# Patient Record
Sex: MALE | Race: WHITE | NOT HISPANIC OR LATINO | Employment: OTHER | URBAN - METROPOLITAN AREA
[De-identification: names, ages, dates, MRNs, and addresses within clinical notes are randomized per-mention and may not be internally consistent; named-entity substitution may affect disease eponyms.]

---

## 2017-02-07 ENCOUNTER — OFFICE VISIT (OUTPATIENT)
Dept: NEUROLOGY | Facility: CLINIC | Age: 82
End: 2017-02-07
Payer: MEDICARE

## 2017-02-07 VITALS
WEIGHT: 144.63 LBS | DIASTOLIC BLOOD PRESSURE: 68 MMHG | SYSTOLIC BLOOD PRESSURE: 134 MMHG | RESPIRATION RATE: 17 BRPM | HEIGHT: 72 IN | HEART RATE: 88 BPM | BODY MASS INDEX: 19.59 KG/M2

## 2017-02-07 DIAGNOSIS — G30.9 ALZHEIMER'S DEMENTIA WITH BEHAVIORAL DISTURBANCE, UNSPECIFIED TIMING OF DEMENTIA ONSET: ICD-10-CM

## 2017-02-07 DIAGNOSIS — F02.81 ALZHEIMER'S DEMENTIA WITH BEHAVIORAL DISTURBANCE, UNSPECIFIED TIMING OF DEMENTIA ONSET: ICD-10-CM

## 2017-02-07 PROBLEM — F02.818 ALZHEIMER'S DEMENTIA WITH BEHAVIORAL DISTURBANCE: Status: ACTIVE | Noted: 2017-02-07

## 2017-02-07 PROCEDURE — 99999 PR PBB SHADOW E&M-EST. PATIENT-LVL III: CPT | Mod: PBBFAC,,, | Performed by: NURSE PRACTITIONER

## 2017-02-07 PROCEDURE — 99213 OFFICE O/P EST LOW 20 MIN: CPT | Mod: S$PBB,,, | Performed by: NURSE PRACTITIONER

## 2017-02-07 PROCEDURE — 99213 OFFICE O/P EST LOW 20 MIN: CPT | Mod: PBBFAC | Performed by: NURSE PRACTITIONER

## 2017-02-07 NOTE — MR AVS SNAPSHOT
[facility]  [address] [facility] [address]  Phone: [phone]  Fax: [phone]                  Maritza Coreas Jr.   2017 2:20 PM   Office Visit    Description:  Male : 1930   Provider:  Heide Martinez NP   Department:  [facility]           Reason for Visit     Memory Loss                To Do List           Future Appointments        Provider Department Dept Phone    2017 2:20 PM Heide Martinez NP [facility] [phone]      Goals (5 Years of Data)     None      Follow-Up and Disposition     Return in about 3 months (around 2017).      [facility]     [facility] Nurse Care Line -  Assistance  Registered nurses in the [facility] Call Center provide clinical advisement, health education, appointment booking, and other advisory services.  Call for this free service at [phone].             Medications           Message regarding Medications     Verify the changes and/or additions to your medication regime listed below are the same as discussed with your clinician today.  If any of these changes or additions are incorrect, please notify your healthcare provider.             Verify that the below list of medications is an accurate representation of the medications you are currently taking.  If none reported, the list may be blank. If incorrect, please contact your healthcare provider. Carry this list with you in case of emergency.           Current Medications     aspirin 81 MG Chew Take 81 mg by mouth every Mon, Wed, Fri.    memantine (NAMENDA) 5 MG Tab Take 1 tablet (5 mg total) by mouth 2 (two) times daily.    MULTIVIT-MIN/FA/LYCOPENE/LUT (CENTRUM SILVER ULTRA MEN'S ORAL) Take 1 tablet by mouth once daily.    quetiapine (SEROQUEL) 25 MG Tab Take 1 tablet by mouth every morning and 2 tablets every evening.           Clinical Reference Information           Your Vitals Were     BP Pulse Resp Height  Weight BMI    134/68 (BP Location: Left arm, Patient Position: Sitting, BP Method: Manual) 88 17 6' (1.829 m) 65.6 kg (144 lb 10 oz) 19.61 kg/m2      Blood Pressure          Most Recent Value    BP  134/68      Allergies as of 2/7/2017     No Known Allergies      Immunizations Administered on Date of Encounter - 2/7/2017     None      [facility] Sign-Up     Activating your [facility] account is as easy as 1-2-3!     1) Visit [email or web address], select Sign Up Now, enter this activation code and your date of birth, then select Next.  Activation code not generated  Current Patient Portal Status: Account disabled      2) Create a username and password to use when you visit [facility] in the future and select a security question in case you lose your password and select Next.    3) Enter your e-mail address and click Sign Up!    Additional Information  If you have questions, please e-mail [email or web address] or call [phone] to talk to our [facility] staff. Remember, [facility] is NOT to be used for urgent needs. For medical emergencies, dial 911.         Language Assistance Services     ATTENTION: Language assistance services are available, free of charge. Please call [phone].      ATENCIÓN: Si habla español, tiene a nunes disposición servicios gratuitos de asistencia lingüística. Llame al [phone].     CHÚ Ý: N?u b?n nói Ti?ng Vi?t, có các d?ch v? h? tr? ngôn ng? mi?n phí dành cho b?n. G?i s? [phone].         [facility] complies with applicable Federal civil rights laws and does not discriminate on the basis of race, color, national origin, age, disability, or sex.

## 2017-02-07 NOTE — PROGRESS NOTES
HPI: Maritza Coreas Jr. is a 86 y.o. male with a history of short term memory loss with some executive dysfunctions and bouts of confusions, most consistent with Alzheimer's disease.     He presents today for a routine follow up visit. He continues with agitation in the afternoon, as well as confusion which becomes worse later in the day, which is helped with Seroquel. He does talk to his TV and cannot understand that persons on the TV are not actually present. He also continues on Namenda. He has had recent labs done by his PCP.     He has gained weight since his last visit, and has a good appetite. He has had no falls per his wife. He is sleeping well at night.     Review of Systems  Review of Systems   Unable to perform ROS: Dementia     Objective:      Physical Exam   Constitutional: He appears well-developed and well-nourished. No distress.   Eyes: EOM are normal. Pupils are equal, round, and reactive to light.   Cardiovascular: Intact distal pulses.    Musculoskeletal: He exhibits no edema.   Neurological: He has normal strength. He has a normal Finger-Nose-Finger Test, a normal Heel to Raines Test and a normal Romberg Test. Gait normal.   Psychiatric: His speech is normal.   Neurologic Exam     Mental Status   Disoriented to person.   Disoriented to place.   Disoriented to time. (He thinks it is November which is incorrect)  Recall of objects at 5 minutes: Recalls 0/3 objects at 3 minutes. He recalls 1/3 with hints.   Attention: normal. Concentration: normal.   Speech: speech is normal   Level of consciousness: alert  Knowledge: inconsistent with education.   Able to name object. Able to repeat. Normal comprehension.        Remote memory better intact but recent recall is confused.   The patient has poor insight.  Patient speaks about enjoying his harmonica playing  There are some paraphasic errors of speech, however     Cranial Nerves     CN II   Visual fields full to confrontation.   Right visual field  deficit: none    CN III, IV, VI   Pupils are equal, round, and reactive to light.  Extraocular motions are normal.   CN III: no CN III palsy  Nystagmus: none   Diplopia: none  Ophthalmoparesis: none    CN V   Facial sensation intact.     CN VII   Facial expression full, symmetric.     CN VIII   CN VIII normal.     CN IX, X   CN IX normal.   CN X normal.     CN XI   CN XI normal.     CN XII   CN XII normal.        Optic disc are flat with normal vasculature      Motor Exam   Muscle bulk: normal  Overall muscle tone: normal    Strength   Strength 5/5 throughout.        Strength exam is limited by apraxi     Sensory Exam   Light touch normal.   Vibration normal.     Gait, Coordination, and Reflexes     Gait  Gait: normal    Coordination   Romberg: negative  Finger to nose coordination: normal  Heel to shin coordination: normal    Tremor   Resting tremor: absent  Intention tremor: absent  Action tremor: absent    Reflexes   Right ankle clonus: absent  Left ankle clonus: absent       1+ reflexes in the upper and lower extremities throughout    Patient is calm and pleasant    CT head: Age-appropriate generalized cerebral volume loss with severe degree of patchy decreased attenuation supratentorial white matter suggestive for chronic microvascular ischemic change.   No evidence for acute intracranial hemorrhage. Clinical correlation and further evaluation as warranted    Assessment/ Recommendations:    Maritza Coreas Jr. is a 86 y.o. male with a history of short term memory loss with some executive dysfunctions and bouts of confusions, most consistent with Alzheimer's disease. His overall mentation is unchanged. He continues with bouts of confusion and sundowning behavior, which is helped with Seroquel.     I recommend:   1. Continue low dose Namenda, given his history of renal insufficiency. He previously experienced adverse effects with Trazodone and Aricept.    2. Continue Seroquel for agitation and sundowning  behavior. The benefits of treating poor behavior/ mood/agitation seem to outweigh the risk of using the lowest possible dose of this medication for the shortest period of time possible. The patient should be brought to the ER or dial 911 at any point for worsening mood or if he/she is a threat to self or others.  3. Obtain recent labs from Dr. Danielson to monitor renal function, lipids, and fasting glucose, given his Seroquel and Namenda use. His anemia is treated per Dr. Mayes.     RTC 3 months

## 2017-02-09 ENCOUNTER — TELEPHONE (OUTPATIENT)
Dept: NEUROLOGY | Facility: CLINIC | Age: 82
End: 2017-02-09

## 2017-02-09 NOTE — TELEPHONE ENCOUNTER
"At his visit this week, patient's wife was inquiring about a "new" Alzheimer's medication, but was unable to offer complete information. It appears that she was asking about Namzaric. Namzaric is actually a combination of Namenda and Aricept. He is already taking Namenda, and had side effects with Aricept. Because he experienced side effects with Aricept, he would not be a candidate for Namzaric.   "

## 2017-04-26 ENCOUNTER — TELEPHONE (OUTPATIENT)
Dept: NEUROLOGY | Facility: CLINIC | Age: 82
End: 2017-04-26

## 2017-04-26 ENCOUNTER — OFFICE VISIT (OUTPATIENT)
Dept: NEUROLOGY | Facility: CLINIC | Age: 82
End: 2017-04-26
Payer: MEDICARE

## 2017-04-26 VITALS
SYSTOLIC BLOOD PRESSURE: 138 MMHG | DIASTOLIC BLOOD PRESSURE: 78 MMHG | HEIGHT: 72 IN | BODY MASS INDEX: 19.02 KG/M2 | WEIGHT: 140.44 LBS | RESPIRATION RATE: 16 BRPM | HEART RATE: 60 BPM

## 2017-04-26 DIAGNOSIS — F02.81 ALZHEIMER'S DEMENTIA WITH BEHAVIORAL DISTURBANCE, UNSPECIFIED TIMING OF DEMENTIA ONSET: Primary | ICD-10-CM

## 2017-04-26 DIAGNOSIS — G30.9 ALZHEIMER'S DEMENTIA WITH BEHAVIORAL DISTURBANCE, UNSPECIFIED TIMING OF DEMENTIA ONSET: Primary | ICD-10-CM

## 2017-04-26 PROCEDURE — 1157F ADVNC CARE PLAN IN RCRD: CPT | Mod: 8P | Performed by: NURSE PRACTITIONER

## 2017-04-26 PROCEDURE — 1159F MED LIST DOCD IN RCRD: CPT | Performed by: NURSE PRACTITIONER

## 2017-04-26 PROCEDURE — 1126F AMNT PAIN NOTED NONE PRSNT: CPT | Performed by: NURSE PRACTITIONER

## 2017-04-26 PROCEDURE — 99214 OFFICE O/P EST MOD 30 MIN: CPT | Mod: S$PBB | Performed by: NURSE PRACTITIONER

## 2017-04-26 PROCEDURE — 99999 PR PBB SHADOW E&M-EST. PATIENT-LVL II: CPT | Mod: PBBFAC,,, | Performed by: NURSE PRACTITIONER

## 2017-04-26 PROCEDURE — 99212 OFFICE O/P EST SF 10 MIN: CPT | Mod: PBBFAC | Performed by: NURSE PRACTITIONER

## 2017-04-26 RX ORDER — TRAZODONE HYDROCHLORIDE 50 MG/1
50 TABLET ORAL NIGHTLY
Qty: 30 TABLET | Refills: 11 | Status: SHIPPED | OUTPATIENT
Start: 2017-04-26 | End: 2017-06-12 | Stop reason: SDUPTHER

## 2017-04-26 NOTE — MR AVS SNAPSHOT
[facility]  [address] [facility] [address]  Phone: [phone]  Fax: [phone]                  Maritza Coreas Jr.   2017 11:40 AM   Office Visit    Description:  Male : 1930   Provider:  Heide Martinez NP   Department:  [facility]           Reason for Visit     Memory Loss           Diagnoses this Visit        Comments    Alzheimer's dementia with behavioral disturbance, unspecified timing of dementia onset    -  Primary            To Do List           Goals (5 Years of Data)     None      Follow-Up and Disposition     Return in about 4 months (around 2017).       These Medications        Disp Refills Start End    trazodone (DESYREL) 50 MG tablet 30 tablet 11 2017    Take 1 tablet (50 mg total) by mouth every evening. - Oral    Pharmacy: [address] [address] [facility] [address] Ph #: [phone]         [facility]     [facility] On Call Nurse Care Line -  Assistance  Unless otherwise directed by your provider, please contact [facility], our nurse care line that is available for  assistance.     Registered nurses in the [facility] Center provide: appointment scheduling, clinical advisement, health education, and other advisory services.  Call: [phone] (toll free)               Medications           Message regarding Medications     Verify the changes and/or additions to your medication regime listed below are the same as discussed with your clinician today.  If any of these changes or additions are incorrect, please notify your healthcare provider.        START taking these NEW medications        Refills    trazodone (DESYREL) 50 MG tablet 11    Sig: Take 1 tablet (50 mg total) by mouth every evening.    Class: Normal    Route: Oral           Verify that the below list of medications is an accurate representation of the medications you are currently taking.  If none reported,  the list may be blank. If incorrect, please contact your healthcare provider. Carry this list with you in case of emergency.           Current Medications     aspirin 81 MG Chew Take 81 mg by mouth every Mon, Wed, Fri.    memantine (NAMENDA) 5 MG Tab Take 1 tablet (5 mg total) by mouth 2 (two) times daily.    MULTIVIT-MIN/FA/LYCOPENE/LUT (CENTRUM SILVER ULTRA MEN'S ORAL) Take 1 tablet by mouth once daily.    quetiapine (SEROQUEL) 25 MG Tab Take 1 tablet by mouth every morning and 2 tablets every evening.    trazodone (DESYREL) 50 MG tablet Take 1 tablet (50 mg total) by mouth every evening.           Clinical Reference Information           Your Vitals Were     BP Pulse Resp Height Weight BMI    138/78 (BP Location: Right arm, Patient Position: Sitting, BP Method: Manual) 60 16 6' (1.829 m) 63.7 kg (140 lb 6.9 oz) 19.05 kg/m2      Blood Pressure          Most Recent Value    BP  138/78      Allergies as of 4/26/2017     No Known Allergies      Immunizations Administered on Date of Encounter - 4/26/2017     None      [facility] Sign-Up     Activating your [facility] account is as easy as 1-2-3!     1) Visit [email or web address], select Sign Up Now, enter this activation code and your date of birth, then select Next.  Activation code not generated  Current Patient Portal Status: Account disabled      2) Create a username and password to use when you visit [facility] in the future and select a security question in case you lose your password and select Next.    3) Enter your e-mail address and click Sign Up!    Additional Information  If you have questions, please e-mail [email or web address] or call [phone] to talk to our [facility] staff. Remember, [facility] is NOT to be used for urgent needs. For medical emergencies, dial 911.         Language Assistance Services     ATTENTION: Language assistance services are available, free of charge. Please call [phone].      ATENCIÓN: Si habla español, tiene a nunes disposición  servicios gratuitos de asistencia lingüística. Sakshi mata [phone].     [facility] Ý: N?u b?n nói Ti?ng Vi?t, có các d?ch v? h? tr? ngôn ng? mi?n phí dành cho b?n. G?i s? [phone].         [facility] complies with applicable Federal civil rights laws and does not discriminate on the basis of race, color, national origin, age, disability, or sex.

## 2017-04-26 NOTE — PROGRESS NOTES
HPI: Maritza Coreas Jr. is a 86 y.o. male with a history of short term memory loss with some executive dysfunctions and bouts of confusions, most consistent with Alzheimer's disease.     He presents today for a routine follow up visit. While his memory is essentially unchanged, he is experiencing more agitation in the evening, as well as increased confusion when watching television programs, which involve violence, as he believes that the actors will hurt him as well. He is resting well at night with Seroquel, and has not had any hallucinations during the day.     His appetite is reportedly good, and he has not had any falls.     Review of Systems  Review of Systems   Unable to perform ROS: Dementia     Objective:      Physical Exam   Constitutional: He appears well-developed and well-nourished. No distress.   Eyes: EOM are normal. Pupils are equal, round, and reactive to light.   Cardiovascular: Intact distal pulses.    Musculoskeletal: He exhibits no edema.   Neurological: He has normal strength. He has a normal Finger-Nose-Finger Test, a normal Heel to Raines Test and a normal Romberg Test. Gait normal.   Psychiatric: His speech is normal.   Neurologic Exam     Mental Status   Disoriented to person.   Disoriented to place.   Disoriented to time. (He thinks it is November which is incorrect)  Recall of objects at 5 minutes: Recalls 0/3 objects at 3 minutes. He recalls 1/3 with hints.   Attention: normal. Concentration: normal.   Speech: speech is normal   Level of consciousness: alert  Knowledge: inconsistent with education.   Able to name object. Able to repeat. Normal comprehension.        Remote memory better intact but recent recall is confused.   The patient has poor insight.  Patient speaks about enjoying his harmonica playing  There are some paraphasic errors of speech, however     Cranial Nerves     CN II   Visual fields full to confrontation.   Right visual field deficit: none    CN III, IV, VI   Pupils  are equal, round, and reactive to light.  Extraocular motions are normal.   CN III: no CN III palsy  Nystagmus: none   Diplopia: none  Ophthalmoparesis: none    CN V   Facial sensation intact.     CN VII   Facial expression full, symmetric.     CN VIII   CN VIII normal.     CN IX, X   CN IX normal.   CN X normal.     CN XI   CN XI normal.     CN XII   CN XII normal.        Optic disc are flat with normal vasculature      Motor Exam   Muscle bulk: normal  Overall muscle tone: normal    Strength   Strength 5/5 throughout.        Strength exam is limited by apraxi     Sensory Exam   Light touch normal.   Vibration normal.     Gait, Coordination, and Reflexes     Gait  Gait: normal    Coordination   Romberg: negative  Finger to nose coordination: normal  Heel to shin coordination: normal    Tremor   Resting tremor: absent  Intention tremor: absent  Action tremor: absent    Reflexes   Right ankle clonus: absent  Left ankle clonus: absent       1+ reflexes in the upper and lower extremities throughout    Patient is calm and pleasant    CT head: Age-appropriate generalized cerebral volume loss with severe degree of patchy decreased attenuation supratentorial white matter suggestive for chronic microvascular ischemic change.   No evidence for acute intracranial hemorrhage. Clinical correlation and further evaluation as warranted    Assessment/ Recommendations:    Maritza Coreas Jr. is a 86 y.o. male with a history of short term memory loss with some executive dysfunctions and bouts of confusions, most consistent with Alzheimer's disease.     I recommend:   1. Restart Trazodone for increased sundowning behavior, but continue Seroquel 25 mg qd, and Seroquel 50 mg qhs. The benefits of treating poor behavior/ mood/agitation with Seroquel seem to outweigh the risk of using the lowest possible dose of this medication for the shortest period of time possible.   2. Continue low dose Namenda, given his history of renal  insufficiency. He previously experienced adverse effects with Aricept.    3. The patient should be brought to the ER or dial 911 at any point for worsening mood or if he/she is a threat to self or others.  4. Routine labs are done per his PCP, which I will request for review, given his Seroquel and Namenda use.     RTC 3 months

## 2017-04-26 NOTE — TELEPHONE ENCOUNTER
Aricept is not indicated for dementia, which is more advanced, as in his case. Aricept is a memory medication and is not going to help his behavioral issues.     In reviewing his previous experience with Trazodone, he was taking the Trazodone for several months prior to the onset of his hallucinations. It is more likely that his disease progressed and that he needed the addition of the Seroquel. I do not believe that the Trazodone was causing his hallucinations.     He should try the Trazodone again, as this should help with his agitation in the evening, which was her greatest concern at today's visit.

## 2017-04-26 NOTE — TELEPHONE ENCOUNTER
----- Message from Lakshmi Martinez sent at 2017  2:31 PM CDT -----  Contact: Veronica/spouse  Maritza Coreas Jr.  MRN: [id number]  : 1930  PCP: Alvaro Danielson  Home Phone      [phone]  Work Phone      Not on file.  Mobile          [phone]      MESSAGE: the patient's wife called stating the patient is currently taking donepezil (ARICEPT) 5 MG tablet. She states she will start giving the patient the trazodone (DESYREL) 50 MG tablet after he is finished taking the donepezil.  Phone:[phone]

## 2017-04-26 NOTE — TELEPHONE ENCOUNTER
"Spoke with patient states who states that she gave the patient Aricept 5 mg twice last week because he was " acting up and having a bad week". Wife states that she wants to finish giving the Aricept, she has about 5 tablets left, before starting the Trazodone. Please advise.  "

## 2017-06-12 DIAGNOSIS — F02.81 ALZHEIMER'S DEMENTIA WITH BEHAVIORAL DISTURBANCE, UNSPECIFIED TIMING OF DEMENTIA ONSET: ICD-10-CM

## 2017-06-12 DIAGNOSIS — G30.9 ALZHEIMER'S DEMENTIA WITH BEHAVIORAL DISTURBANCE, UNSPECIFIED TIMING OF DEMENTIA ONSET: ICD-10-CM

## 2017-06-12 RX ORDER — TRAZODONE HYDROCHLORIDE 50 MG/1
50 TABLET ORAL NIGHTLY
Qty: 90 TABLET | Refills: 3 | Status: SHIPPED | OUTPATIENT
Start: 2017-06-12 | End: 2018-06-12

## 2017-06-12 NOTE — TELEPHONE ENCOUNTER
----- Message from Ava Ferreira sent at 2017 12:26 PM CDT -----  Contact: WIFE - ROSA Coreas Jr.  MRN: [id number]  : 1930  PCP: Alvaro Danielson  Home Phone      [phone]  Work Phone      Not on file.  Mobile          [phone]      MESSAGE: Wife would like a 90 day prescription of Trazodone 50 mg 1 at bedtime sent to [facility].  She states that it is easier if it is for the 90 days.        Phone: [phone]

## 2017-08-10 RX ORDER — MEMANTINE HYDROCHLORIDE 5 MG/1
5 TABLET ORAL 2 TIMES DAILY
Qty: 180 TABLET | Refills: 3 | Status: SHIPPED | OUTPATIENT
Start: 2017-08-10 | End: 2017-11-09

## 2017-08-10 NOTE — TELEPHONE ENCOUNTER
----- Message from Lakshmi Martinez sent at 8/10/2017 10:50 AM CDT -----  Contact: Hannah Deluna  Maritza Coreas JrJustin  MRN: [id number]  : 1930  PCP: Alvaro Danielson  Home Phone      [phone]  Work Phone      Not on file.  Mobile          [phone]      MESSAGE: The pharmacy is requesting a refill on memantine (NAMENDA) 5 MG Tab. Take one tablet by mouth twice daily. Qty:120.  Phone:[phone]  Fax:[phone]

## 2017-11-09 ENCOUNTER — OFFICE VISIT (OUTPATIENT)
Dept: NEUROLOGY | Facility: CLINIC | Age: 82
End: 2017-11-09
Payer: MEDICARE

## 2017-11-09 ENCOUNTER — LAB VISIT (OUTPATIENT)
Dept: LAB | Facility: HOSPITAL | Age: 82
End: 2017-11-09
Attending: NURSE PRACTITIONER
Payer: MEDICARE

## 2017-11-09 VITALS — HEIGHT: 72 IN | HEART RATE: 76 BPM | DIASTOLIC BLOOD PRESSURE: 60 MMHG | SYSTOLIC BLOOD PRESSURE: 122 MMHG

## 2017-11-09 DIAGNOSIS — F02.81 ALZHEIMER'S DEMENTIA WITH BEHAVIORAL DISTURBANCE, UNSPECIFIED TIMING OF DEMENTIA ONSET: Primary | ICD-10-CM

## 2017-11-09 DIAGNOSIS — Z79.899 ENCOUNTER FOR LONG-TERM CURRENT USE OF MEDICATION: ICD-10-CM
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
## 2017-11-09 DIAGNOSIS — G30.9 ALZHEIMER'S DEMENTIA WITH BEHAVIORAL DISTURBANCE, UNSPECIFIED TIMING OF DEMENTIA ONSET: ICD-10-CM

## 2017-11-09 DIAGNOSIS — Z74.09 IMMOBILITY: ICD-10-CM

## 2017-11-09 DIAGNOSIS — F02.81 ALZHEIMER'S DEMENTIA WITH BEHAVIORAL DISTURBANCE, UNSPECIFIED TIMING OF DEMENTIA ONSET: ICD-10-CM

## 2017-11-09 DIAGNOSIS — G30.9 ALZHEIMER'S DEMENTIA WITH BEHAVIORAL DISTURBANCE, UNSPECIFIED TIMING OF DEMENTIA ONSET: Primary | ICD-10-CM
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
## 2017-11-09 LAB
ALBUMIN SERPL BCP-MCNC: 3.3 G/DL
ALP SERPL-CCNC: 128 U/L
ALT SERPL W/O P-5'-P-CCNC: 9 U/L
ANION GAP SERPL CALC-SCNC: 12 MMOL/L
AST SERPL-CCNC: 22 U/L
BASOPHILS # BLD AUTO: 0.01 K/UL
BASOPHILS NFR BLD: 0.1 %
BILIRUB SERPL-MCNC: 0.9 MG/DL
BUN SERPL-MCNC: 20 MG/DL
CALCIUM SERPL-MCNC: 9.6 MG/DL
CHLORIDE SERPL-SCNC: 102 MMOL/L
CHOLEST SERPL-MCNC: 185 MG/DL
CHOLEST/HDLC SERPL: 3.5 {RATIO}
CO2 SERPL-SCNC: 27 MMOL/L
CREAT SERPL-MCNC: 1 MG/DL
DIFFERENTIAL METHOD: ABNORMAL
EOSINOPHIL # BLD AUTO: 0 K/UL
EOSINOPHIL NFR BLD: 0 %
ERYTHROCYTE [DISTWIDTH] IN BLOOD BY AUTOMATED COUNT: 14.9 %
EST. GFR  (AFRICAN AMERICAN): >60 ML/MIN/1.73 M^2
EST. GFR  (NON AFRICAN AMERICAN): >60 ML/MIN/1.73 M^2
GLUCOSE SERPL-MCNC: 104 MG/DL
HCT VFR BLD AUTO: 37.8 %
HDLC SERPL-MCNC: 53 MG/DL
HDLC SERPL: 28.6 %
HGB BLD-MCNC: 12.5 G/DL
LDLC SERPL CALC-MCNC: 117 MG/DL
LYMPHOCYTES # BLD AUTO: 1.3 K/UL
LYMPHOCYTES NFR BLD: 14.9 %
MCH RBC QN AUTO: 29.5 PG
MCHC RBC AUTO-ENTMCNC: 33.1 G/DL
MCV RBC AUTO: 89 FL
MONOCYTES # BLD AUTO: 1.3 K/UL
MONOCYTES NFR BLD: 14.9 %
NEUTROPHILS # BLD AUTO: 6.3 K/UL
NEUTROPHILS NFR BLD: 70.1 %
NONHDLC SERPL-MCNC: 132 MG/DL
PLATELET # BLD AUTO: 246 K/UL
PMV BLD AUTO: 8.8 FL
POTASSIUM SERPL-SCNC: 3.6 MMOL/L
PROT SERPL-MCNC: 7.8 G/DL
RBC # BLD AUTO: 4.24 M/UL
SODIUM SERPL-SCNC: 141 MMOL/L
TRIGL SERPL-MCNC: 75 MG/DL
TSH SERPL DL<=0.005 MIU/L-ACNC: 1.42 UIU/ML
VIT B12 SERPL-MCNC: 401 PG/ML
WBC # BLD AUTO: 8.95 K/UL

## 2017-11-09 PROCEDURE — 36415 COLL VENOUS BLD VENIPUNCTURE: CPT

## 2017-11-09 PROCEDURE — 99999 PR PBB SHADOW E&M-EST. PATIENT-LVL III: CPT | Mod: PBBFAC,,, | Performed by: NURSE PRACTITIONER

## 2017-11-09 PROCEDURE — 80053 COMPREHEN METABOLIC PANEL: CPT

## 2017-11-09 PROCEDURE — 85025 COMPLETE CBC W/AUTO DIFF WBC: CPT

## 2017-11-09 PROCEDURE — 82607 VITAMIN B-12: CPT

## 2017-11-09 PROCEDURE — 80061 LIPID PANEL: CPT

## 2017-11-09 PROCEDURE — 99999 PR STA SHADOW: CPT | Mod: PBBFAC,,, | Performed by: NURSE PRACTITIONER

## 2017-11-09 PROCEDURE — 99214 OFFICE O/P EST MOD 30 MIN: CPT | Mod: S$PBB | Performed by: NURSE PRACTITIONER

## 2017-11-09 PROCEDURE — 84443 ASSAY THYROID STIM HORMONE: CPT

## 2017-11-09 PROCEDURE — 99213 OFFICE O/P EST LOW 20 MIN: CPT | Mod: PBBFAC | Performed by: NURSE PRACTITIONER

## 2017-11-09 RX ORDER — RIVASTIGMINE TARTRATE 1.5 MG/1
1.5 CAPSULE ORAL 2 TIMES DAILY
Qty: 120 CAPSULE | Refills: 11 | Status: SHIPPED | OUTPATIENT
Start: 2017-11-09 | End: 2018-11-09

## 2017-11-09 RX ORDER — QUETIAPINE FUMARATE 25 MG/1
TABLET, FILM COATED ORAL
Qty: 180 TABLET | Refills: 3 | Status: SHIPPED | OUTPATIENT
Start: 2017-11-09

## 2017-11-09 RX ORDER — DONEPEZIL HYDROCHLORIDE 5 MG/1
5 TABLET, FILM COATED ORAL NIGHTLY
COMMUNITY
End: 2017-11-09 | Stop reason: ALTCHOICE

## 2017-11-09 NOTE — PROGRESS NOTES
"HPI: Maritza Coreas Jr. is a 86 y.o. male with a history of short term memory loss with some executive dysfunctions and bouts of confusions, most consistent with Alzheimer's disease.     He presents today for a routine follow up visit. He is sleeping at night; however, he is "restless", tossing and turning, speaking in his sleep, and sometimes hitting his wife while he is asleep. He has more agitation in the evening and his wife has a great deal of difficulty administering his medications.     He has been falling more often, is weaker, and is very difficult to transfer by his wife.     His appetite remains adequate.     Review of Systems  Review of Systems   Unable to perform ROS: Dementia     Objective:      Physical Exam   Constitutional: He appears well-developed and well-nourished. No distress.   Eyes: EOM are normal. Pupils are equal, round, and reactive to light.   Cardiovascular: Intact distal pulses.    Musculoskeletal: He exhibits no edema.   Neurological: He has normal strength. He has a normal Finger-Nose-Finger Test, a normal Heel to Raines Test and a normal Romberg Test. Gait normal.   Psychiatric: His speech is normal.   Neurologic Exam     Mental Status   Disoriented to person.   Disoriented to place.   Disoriented to time. (He thinks it is November which is incorrect)  Recall of objects at 5 minutes: Recalls 0/3 objects at 3 minutes. He recalls 1/3 with hints.   Attention: normal. Concentration: normal.   Speech: speech is normal   Level of consciousness: alert  Knowledge: inconsistent with education.   Able to name object. Able to repeat. Normal comprehension.   Remote memory better intact but recent recall is confused.   The patient has poor insight.  Paraphrasic errors of speech     Cranial Nerves     CN II   Visual fields full to confrontation.   Right visual field deficit: none    CN III, IV, VI   Pupils are equal, round, and reactive to light.  Extraocular motions are normal.   CN III: no CN III " palsy  Nystagmus: none   Diplopia: none  Ophthalmoparesis: none    CN V   Facial sensation intact.     CN VII   Facial expression full, symmetric.     CN VIII   CN VIII normal.     CN IX, X   CN IX normal.   CN X normal.     CN XI   CN XI normal.     CN XII   CN XII normal.   Optic disc are flat with normal vasculature      Motor Exam   Muscle bulk: normal  Overall muscle tone: normal    Strength   Strength 5/5 throughout. Strength exam is limited by apraxi     Sensory Exam   Light touch normal.   Vibration normal.     Gait, Coordination, and Reflexes     Gait  Gait: normal    Coordination   Romberg: negative  Finger to nose coordination: normal  Heel to shin coordination: normal    Tremor   Resting tremor: absent  Intention tremor: absent  Action tremor: absent    Reflexes   Right ankle clonus: absent  Left ankle clonus: absent1+ reflexes in the upper and lower extremities throughout    Patient is calm and pleasant    CT head: Age-appropriate generalized cerebral volume loss with severe degree of patchy decreased attenuation supratentorial white matter suggestive for chronic microvascular ischemic change.   No evidence for acute intracranial hemorrhage. Clinical correlation and further evaluation as warranted    Assessment/ Recommendations:    Maritza Coreas Jr. is a 86 y.o. male with a history of short term memory loss with some executive dysfunctions and bouts of confusions, most consistent with Alzheimer's disease.     I recommend:   1. Reduce Seroquel to qhs, and omit morning dose, given his morning fatigue. Continue Trazodone qhs.   2. Stop Aricept and start [employer]. Continue Namenda for dementia.   3. Advised to give nighttime medication at 5:30 or 6:00 p.m., in an attempt to reduce sundowning behavior before it starts. 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 Re-assess this after his medication times have changed.   4. CBC, CMP, TSH, and B12 level today, as well as Lipid Panel, given his  Seroquel use.   5. Consult home health for needs assessment regarding safety, recurrent falls, medication administration challenges, and caregiver role strain.   6. Rx for home wheelchair written, given his weakness and falls.      RTC 3 months

## 2017-11-09 NOTE — PATIENT INSTRUCTIONS
Reduce Quetiapine to 2 at night. Stop morning dose.     Stop Donepezil, and start Exelon for dementia.     Start giving nighttime medications no later than 6 p.m.

## 2017-11-20 ENCOUNTER — TELEPHONE (OUTPATIENT)
Dept: NEUROLOGY | Facility: CLINIC | Age: 82
End: 2017-11-20

## 2017-11-20 DIAGNOSIS — F02.81 ALZHEIMER'S DEMENTIA WITH BEHAVIORAL DISTURBANCE, UNSPECIFIED TIMING OF DEMENTIA ONSET: Primary | ICD-10-CM
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
## 2017-11-20 DIAGNOSIS — Z74.09 IMMOBILITY: ICD-10-CM

## 2017-11-20 DIAGNOSIS — G30.9 ALZHEIMER'S DEMENTIA WITH BEHAVIORAL DISTURBANCE, UNSPECIFIED TIMING OF DEMENTIA ONSET: Primary | ICD-10-CM

## 2017-11-20 NOTE — TELEPHONE ENCOUNTER
----- Message from Lakshmi Martinez sent at 2017  1:26 PM CST -----  Contact: Gretchen/The medical team  Maritza DMITRIY Joss Landaverde.  MRN: [id number]  : 1930  PCP: Alvaro Danielson  Home Phone      [phone]  Work Phone      Not on file.  Mobile          [phone]      MESSAGE: Gretchen states the patient would benefit from a hospital bed as requested by the patient's spouse.  Phone:[phone]  Fax:[phone]

## 2017-11-21 ENCOUNTER — TELEPHONE (OUTPATIENT)
Dept: NEUROLOGY | Facility: CLINIC | Age: 82
End: 2017-11-21

## 2017-11-21 DIAGNOSIS — F02.81 ALZHEIMER'S DEMENTIA WITH BEHAVIORAL DISTURBANCE, UNSPECIFIED TIMING OF DEMENTIA ONSET: Primary | ICD-10-CM

## 2017-11-21 DIAGNOSIS — R63.0 ANOREXIA: ICD-10-CM
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
## 2017-11-21 DIAGNOSIS — Z74.09 IMMOBILITY: ICD-10-CM

## 2017-11-21 DIAGNOSIS — G30.9 ALZHEIMER'S DEMENTIA WITH BEHAVIORAL DISTURBANCE, UNSPECIFIED TIMING OF DEMENTIA ONSET: Primary | ICD-10-CM

## 2017-11-21 NOTE — TELEPHONE ENCOUNTER
----- Message from Ava Ferreira sent at 2017  3:08 PM CST -----  Contact: GRETCHEN - THE MEDICAL TEAM  Maritza Coreas Jr.  MRN: [id number]  : 1930  PCP: Alvaro Danielson  Home Phone      [phone]  Work Phone      Not on file.  Mobile          [phone]      MESSAGE: Gretchen states that the patient has not been eating and has lost a lot of weight since last week.  She also states that the patient is not getting out of bed.  She would like to see if Dr. Van would consider placing the patient on hospice.        Phone: [phone]

## 2017-11-21 NOTE — TELEPHONE ENCOUNTER
----- Message from Bety Maldonado MA sent at 2017  2:14 PM CST -----  Contact: Daughter-- Celeste Coreas Jr.  MRN: [id number]  : 1930  PCP: Alvaro Danielson  Home Phone      [phone]  Work Phone      Not on file.  Mobile          [phone]      MESSAGE:  Is requesting to speak to you about patient. States he is not wanting to eat or drink, he cannot get out of bed without assistance. She states that patient has not seen his PCP in over 2 years, and they are not able to get him there even if they could. Daughter is asking what Dr Van advises. Celeste can be reached at [phone].